# Patient Record
Sex: FEMALE | Race: WHITE | NOT HISPANIC OR LATINO | Employment: FULL TIME | URBAN - METROPOLITAN AREA
[De-identification: names, ages, dates, MRNs, and addresses within clinical notes are randomized per-mention and may not be internally consistent; named-entity substitution may affect disease eponyms.]

---

## 2017-03-13 ENCOUNTER — OFFICE VISIT - HEALTHEAST (OUTPATIENT)
Dept: INTERNAL MEDICINE | Facility: CLINIC | Age: 22
End: 2017-03-13

## 2017-03-13 DIAGNOSIS — K21.9 GERD (GASTROESOPHAGEAL REFLUX DISEASE): ICD-10-CM

## 2017-03-13 ASSESSMENT — MIFFLIN-ST. JEOR: SCORE: 1523.43

## 2017-03-27 ENCOUNTER — RECORDS - HEALTHEAST (OUTPATIENT)
Dept: ADMINISTRATIVE | Facility: OTHER | Age: 22
End: 2017-03-27

## 2017-03-28 ENCOUNTER — RECORDS - HEALTHEAST (OUTPATIENT)
Dept: ADMINISTRATIVE | Facility: OTHER | Age: 22
End: 2017-03-28

## 2017-04-19 ENCOUNTER — OFFICE VISIT - HEALTHEAST (OUTPATIENT)
Dept: INTERNAL MEDICINE | Facility: CLINIC | Age: 22
End: 2017-04-19

## 2017-04-19 DIAGNOSIS — K29.70 GASTRITIS: ICD-10-CM

## 2017-04-19 ASSESSMENT — MIFFLIN-ST. JEOR: SCORE: 1523.43

## 2017-04-20 ENCOUNTER — COMMUNICATION - HEALTHEAST (OUTPATIENT)
Dept: INTERNAL MEDICINE | Facility: CLINIC | Age: 22
End: 2017-04-20

## 2021-05-30 VITALS — HEIGHT: 66 IN | BODY MASS INDEX: 27.48 KG/M2 | WEIGHT: 171 LBS

## 2021-05-30 VITALS — BODY MASS INDEX: 27.48 KG/M2 | HEIGHT: 66 IN | WEIGHT: 171 LBS

## 2021-06-09 NOTE — PROGRESS NOTES
Office Visit - Follow up    Radha Velázquez   21 y.o. female    Date of Visit: 3/13/2017    Chief Complaint   Patient presents with     Gastroesophageal Reflux       Subjective: New patient office visit for acid reflux.    Lump in throat will go away excessive gas.  Dry cough getting better.  There is a family history of Snider's esophagus.  No substernal burning sensation no weight loss no dysphagia or odynophagia.    No melena no blood in stool or urine uses ibuprofen once a week or headache.  Medication list reviewed.  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    ROS: A comprehensive review of systems was performed and was otherwise negative    Medications:  Prior to Admission medications    Not on File       Allergies: No Known Allergies    Immunizations:   There is no immunization history on file for this patient.    Exam Chest clear to auscultation and percussion.  Heart tones regular rhythm without murmur rub or gallop.  Abdomen soft nontender no organomegaly.  No peritoneal signs.  Extremities free of edema cyanosis or clubbing.  Neck veins nondistended no thyromegaly or scleral icterus noted, carotids full.  Skin warm and dry easily conversant good spirited.  Normal intelligence.  Neurologically intact no gross localizing findings.  No goiter.    Assessment and Plan  Acid reflux disease goiter.  And dysphagia symptoms subsiding.  Needs [facility] consult for upper GI endoscopy.    Headaches uses ibuprofen once a week.    Overweight discussed see below BMI 28.    Lump in throat.  Needs follow-up exam 1 month's time.    Time: total time spent with the patient was 40 minutes of which >50% was spent in counseling and coordination of care    The following high BMI interventions were performed this visit: encouragement to exercise    Ramón Ramirez MD    There is no problem list on file for this patient.

## 2021-06-10 NOTE — PROGRESS NOTES
Office Visit - Follow up    Radha Velázquez   21 y.o. female    Date of Visit: 4/19/2017    Chief Complaint   Patient presents with     Gastroesophageal Reflux       Subjective: Gastritis.  With acid reflux dyspepsia.  Feels like a lump in her throat recent EGD D endoscopy study showed gastritis normal esophagus normal duodenum no ulcer or neoplasia.  H. pylori biopsies negative.    Prior history of thyroid issue.    Denies blood in stool or urine med list reviewed.    ROS: A comprehensive review of systems was performed and was otherwise negative    Medications:  Prior to Admission medications    Not on File       Allergies: No Known Allergies    Immunizations:   There is no immunization history on file for this patient.    Exam Chest clear to auscultation and percussion.  Heart tones regular rhythm without murmur rub or gallop.  Abdomen soft nontender no organomegaly.  No peritoneal signs.  Extremities free of edema cyanosis or clubbing.  Neck veins nondistended no thyromegaly or scleral icterus noted, carotids full.  Skin warm and dry easily conversant good spirited.  Normal intelligence.  Neurologically intact no gross localizing findings.    Assessment and Plan  Gastritis less alcohol less coffee and chocolate caffeine.  Suggest Pepcid 20 mg twice daily over-the-counter Pepcid AC specifically.  Check TSH level today.    Hypothyroidism questionable.    Overweight discussed see below.    Time: total time spent with the patient was 25 minutes of which >50% was spent in counseling and coordination of care    The following high BMI interventions were performed this visit: encouragement to exercise return to clinic 2 months.    Ramón Ramirez MD    Patient Active Problem List   Diagnosis     Gastritis

## 2021-06-15 PROBLEM — K29.70 GASTRITIS: Status: ACTIVE | Noted: 2017-04-19

## 2024-06-05 ENCOUNTER — TELEPHONE (OUTPATIENT)
Dept: CALL CENTER | Age: 29
End: 2024-06-05
Payer: COMMERCIAL

## 2024-06-05 NOTE — TELEPHONE ENCOUNTER
Called and spoke to Radha - she hung up     CALLED AT 1137 AM     If she calls back - ok to schedule with our general ophthalmology team - dr. Hidalgo / Dr. Barahona / Dr. Parra in a new adult eye     PLS DO NOT SCHEDULE WITH CHELSEY -     Abeba Mc Communication Facilitator on 6/5/2024 at 11:41 AM

## 2024-06-05 NOTE — TELEPHONE ENCOUNTER
Health Call Center    Phone Message    May a detailed message be left on voicemail: yes     Reason for Call: Other: Patient was seen at [facility] ER yesterday and was referred to see an ophthalmologist at [facility] for ER follow up. Patient works at [employer] [facility] [employer] and they told her she should see Dr Aceves neuro ophthalmologist instead of general because one of her eyes is more dilated then the other. Patient will fax medical records in today to [phone].     Once received please review and call patient back at [phone] to schedule with appropriate provider. Thank you.    Action Taken: Message routed to:  [facility] ([facility]): Eye    Travel Screening: Not Applicable     Date of Service:

## 2024-08-07 NOTE — PROGRESS NOTES
1. Benign episodic mydriasis    Radha complains of asymmetric pupils, larger on the right side. She noticed this around 2.5 months ago and went to the ED for a check up, had a CT head, which was normal. Today we reviewed 2 photos that revealed she had larger asymmetric pupils, one on the right and the other on the left.     Her exam today was reassuring without significant strabismus or ptosis.     Plan:  Follow up as needed      Radha Mark is a pleasant 28 year old White female who presents to my neuro-ophthalmology clinic today having been referred by Dr. Lieberman for asymmetric pupils.      HPI:   She is a 28 years old female, who started to notice larger pupil size on the right side since 2.5 months ago. No headaches, no diplopia, she feels that her eyes are progressively getting worse regarding her distance vision. She feels that the right pupil has been the larger one. We reviewed a couple of photos that she had asymmetric large pupils and this has alternated.      She started to use Lumify about a 1 year ago up to 2.5 months ago.      Since she stopped her lumify she has still had significant asymmetric pupils    Uses Maskara every day.    Patient gets occasional headaches but no migraines.      Independent historians:  Patient    Review of outside testin/4/24 CT head wo contrast   FINDINGS:   INTRACRANIAL CONTENTS: No intracranial hemorrhage, extraaxial collection, or mass effect.  No CT evidence of acute infarct. Normal parenchymal attenuation. Normal ventricles and sulci.     VISUALIZED ORBITS/SINUSES/MASTOIDS: No intraorbital abnormality. No paranasal sinus mucosal disease. No middle ear or mastoid effusion.     BONES/SOFT TISSUES: No acute abnormality.     My interpretation performed today of outside testing:  I have independently reviewed CT performed on 24. I did not see ant orbital abnormalities.    Review of outside clinical notes:    24 Visit with Dr. Satish Eisenberg  [place]      Past medical history:    Patient Active Problem List   Diagnosis    Gastritis     Patient currently has no medications in their medication list.. Lumify, stopped using it 2.5 months ago. List is confirmed today.    Family history / social history:  Patient's family history is not on file. Grandmother had a brain aneurism, she developed symptoms in her 70s.     Patient  reports that she has quit smoking. Her smoking use included cigarettes. She does not have any smokeless tobacco history on file. She reports current alcohol use of about 3.0 standard drinks of alcohol per week. She reports that she does not use drugs.     Exam:  Please see epic chart for complete exam.  The patient has normal afferent visual function in both eyes including normal best corrected visual acuity, color vision, confrontation visual fields, and pupillary light responses in both eyes.     Structural eye exam unremarkable today.    See pupillary exam in the the pupil portion of the exam.           Precharting:  Nando Stephens MD   Fellow, Neuro-Ophthalmology    35 minutes were spent on the date of the encounter by me doing chart review, history and exam, documentation, and further activities as noted above    Complete documentation of historical and exam elements from today's encounter can be found in the full encounter summary report (not reduplicated in this progress note).  I personally obtained the chief complaint(s) and history of present illness.  I confirmed and edited as necessary the review of systems, past medical/surgical history, family history, social history, and examination findings as documented by others; and I examined the patient myself.  I personally reviewed the relevant tests, images, and reports as documented above.  I formulated and edited as necessary the assessment and plan and discussed the findings and management plan with the patient and family     Heladio Aceves MD

## 2024-08-08 ENCOUNTER — OFFICE VISIT (OUTPATIENT)
Dept: OPHTHALMOLOGY | Facility: CLINIC | Age: 29
End: 2024-08-08
Attending: STUDENT IN AN ORGANIZED HEALTH CARE EDUCATION/TRAINING PROGRAM
Payer: COMMERCIAL

## 2024-08-08 DIAGNOSIS — H53.10 SUBJECTIVE VISUAL DISTURBANCE: Primary | ICD-10-CM

## 2024-08-08 DIAGNOSIS — H57.02 PUPIL ASYMMETRY: ICD-10-CM

## 2024-08-08 PROCEDURE — 99214 OFFICE O/P EST MOD 30 MIN: CPT | Performed by: OPHTHALMOLOGY

## 2024-08-08 PROCEDURE — 99203 OFFICE O/P NEW LOW 30 MIN: CPT | Mod: GC | Performed by: OPHTHALMOLOGY

## 2024-08-08 ASSESSMENT — CUP TO DISC RATIO
OS_RATIO: 0.2
OD_RATIO: 0.25

## 2024-08-08 ASSESSMENT — CONF VISUAL FIELD
OS_INFERIOR_NASAL_RESTRICTION: 0
OS_INFERIOR_TEMPORAL_RESTRICTION: 0
OS_SUPERIOR_NASAL_RESTRICTION: 0
OD_SUPERIOR_NASAL_RESTRICTION: 0
OD_NORMAL: 1
OS_NORMAL: 1
OD_SUPERIOR_TEMPORAL_RESTRICTION: 0
OS_SUPERIOR_TEMPORAL_RESTRICTION: 0
OD_INFERIOR_NASAL_RESTRICTION: 0
OD_INFERIOR_TEMPORAL_RESTRICTION: 0
METHOD: COUNTING FINGERS

## 2024-08-08 ASSESSMENT — MARGIN REFLEX DISTANCE
OD_MRD2: 5
OS_MRD2: 5
OS_MRD1: 4
OD_MRD1: 4

## 2024-08-08 ASSESSMENT — VISUAL ACUITY
OS_SC+: -1
OD_SC+: -2
OD_PH_SC: 20/20
OS_SC: 20/50
OD_SC: 20/30
METHOD: SNELLEN - LINEAR
OS_PH_SC: 20/25

## 2024-08-08 ASSESSMENT — TONOMETRY
OD_IOP_MMHG: 21
IOP_METHOD: ICARE
OS_IOP_MMHG: 21

## 2024-08-08 ASSESSMENT — EXTERNAL EXAM - LEFT EYE: OS_EXAM: NORMAL

## 2024-08-08 ASSESSMENT — SLIT LAMP EXAM - LIDS
COMMENTS: NORMAL
COMMENTS: NORMAL

## 2024-08-08 ASSESSMENT — EXTERNAL EXAM - RIGHT EYE: OD_EXAM: NORMAL

## 2024-08-08 NOTE — NURSING NOTE
Chief Complaint(s) and History of Present Illness(es)       New Patient    In both eyes.  Onset was sudden.  This started 4 months ago.  Since onset it is stable.  Associated symptoms include Negative for double vision, eye pain and headache.  Pain was noted as 0/10.             Comments    Radha Mark is a 28 year old female sent for consultation for anisocoria.  Radha reports anisocoria noted in the last 4 months. Typically, the right pupil is larger but it can alternate.  Anisocoria thought to be related to eyedrops (Lumify) but she was instructed to stop taking them per Dr. Barahona and anisocoria still present.  She denies ptosis but says she was talking with Dr. Aceves and he noticed one upper lid appeared droopier. No eye pain or headaches.    NÉSTOR Snow 8/8/2024 7:20 AM

## 2025-05-02 PROBLEM — R10.2 FEELING PELVIC PRESSURE IN PREGNANCY, ANTEPARTUM: Status: RESOLVED | Noted: 2023-08-20 | Resolved: 2025-05-02

## 2025-05-02 PROBLEM — J45.990 EXERCISE INDUCED BRONCHOSPASM: Status: ACTIVE | Noted: 2025-05-02

## 2025-05-02 PROBLEM — H57.04: Status: ACTIVE | Noted: 2025-05-02

## 2025-05-02 PROBLEM — L72.9 SCALP CYST: Status: ACTIVE | Noted: 2025-05-02

## 2025-05-02 PROBLEM — O26.899 FEELING PELVIC PRESSURE IN PREGNANCY, ANTEPARTUM: Status: RESOLVED | Noted: 2023-08-20 | Resolved: 2025-05-02

## 2025-05-02 PROBLEM — J45.990 EXERCISE INDUCED BRONCHOSPASM: Status: RESOLVED | Noted: 2025-05-02 | Resolved: 2025-05-02

## 2025-05-12 ENCOUNTER — TELEPHONE (OUTPATIENT)
Dept: DERMATOLOGY | Facility: CLINIC | Age: 30
End: 2025-05-12
Payer: COMMERCIAL

## 2025-05-12 NOTE — TELEPHONE ENCOUNTER
Online Appointment Request      Health Call Center  Phone Message      Reason for Call: Appointment Intake     Patients Requests:  Appointment Preferences  Reason for appointment  Skin check  Preferred Visit Type  In-person        Action taken: Other: none; Documenting patient was called; detail message was left for patient to call back.

## 2025-05-14 NOTE — PROGRESS NOTES
Procedure: pilar cyst excision of scalp     Indication: Painful and bothersome to patient     Consent: Indications for procedure, risks, benefits and alternatives reviewed with the patient. Informed consent form signed.    Pertinent history for this lesion:   Growing and bothersome scalp cyst, recurrent     Gross Description of lesion   Location (include picture as appropriate): posterior right scalp        Post-op:      Procedure Description:  A time-out was completed verifying correct patient, procedure, site, positioning, and special equipment prior to beginning this procedure.    Site prepped with: alcohol wipes and betadine     Anesthesia: 5 mL of lidocaine 1% with epinephrine     Location of anesthesia injected: local    Incision made with 15-blade scalpel     Complete removal of cyst with capsule intact.     Final size of surgical site: 2 cm    Hemostasis with direct pressure and 4-0 ethilon sutures x 3    Bacitracin ointment applied. Dressing applied.    Specimen sent to pathology.    EBL: minimal    Complications: none    The patient tolerated the procedure well.     Wound care instructions provided to patient.     1. Scalp cyst (Primary)  - Surgical Pathology Exam  - EXC BENIGN SKIN LESION SCLP/NCK/HNDS/FEET/GEN 2.1-3.0 CM     Follow-up: in 1 week for suture removal    Your partner in health,    Kennedy Mcneal MD     Note to patient: The 21st Century Cures Act makes medical notes like this available to patients in the interest of transparency. However, be advised this is a medical document. It is intended as bgrs-dt-kicq communication. It is written in medical language and may contain abbreviations or verbiage that are unfamiliar. It may appear blunt or direct. Medical documents are intended to carry relevant information, facts as evident, and the clinical opinion of the practitioner.

## 2025-05-15 ENCOUNTER — OFFICE VISIT (OUTPATIENT)
Dept: FAMILY MEDICINE | Facility: CLINIC | Age: 30
End: 2025-05-15
Payer: COMMERCIAL

## 2025-05-15 VITALS
HEIGHT: 66 IN | BODY MASS INDEX: 28.06 KG/M2 | DIASTOLIC BLOOD PRESSURE: 74 MMHG | OXYGEN SATURATION: 96 % | WEIGHT: 174.6 LBS | TEMPERATURE: 98 F | RESPIRATION RATE: 16 BRPM | HEART RATE: 63 BPM | SYSTOLIC BLOOD PRESSURE: 108 MMHG

## 2025-05-15 DIAGNOSIS — L72.9 SCALP CYST: Primary | ICD-10-CM

## 2025-05-15 ASSESSMENT — PAIN SCALES - GENERAL: PAINLEVEL_OUTOF10: NO PAIN (0)

## 2025-05-15 ASSESSMENT — PATIENT HEALTH QUESTIONNAIRE - PHQ9: SUM OF ALL RESPONSES TO PHQ QUESTIONS 1-9: 0

## 2025-05-15 NOTE — PROGRESS NOTES
"  {PROVIDER CHARTING PREFERENCE:878520}    Luis Garcia is a 29 year old, presenting for the following health issues:  Cyst Removal        5/15/2025     9:15 AM   Additional Questions   Roomed by Daily CORTÉS   Accompanied by Self         5/15/2025     9:15 AM   Patient Reported Additional Medications   Patient reports taking the following new medications None     History of Present Illness       Reason for visit:  Lump on my head    She eats 2-3 servings of fruits and vegetables daily.She consumes 1 sweetened beverage(s) daily.She exercises with enough effort to increase her heart rate 30 to 60 minutes per day.  She exercises with enough effort to increase her heart rate 4 days per week.   She is taking medications regularly.    {SUPERLIST (Optional):610064}  {additonal problems for provider to add (Optional):559114}    {ROS Picklists (Optional):103191}      Objective    /74 (BP Location: Left arm, Patient Position: Sitting, Cuff Size: Adult Large)   Pulse 63   Temp 98  F (36.7  C) (Oral)   Resp 16   Ht 1.67 m (5' 5.75\")   Wt 79.2 kg (174 lb 9.6 oz)   LMP 04/28/2025   SpO2 96%   BMI 28.40 kg/m    Body mass index is 28.4 kg/m .  Physical Exam   {Exam List (Optional):534582}    {Diagnostic Test Results (Optional):937000}        Signed Electronically by: Kennedy Mcneal MD  {Email feedback regarding this note to [email or web address]   :183024}  "

## 2025-05-21 LAB
PATH REPORT.COMMENTS IMP SPEC: NORMAL
PATH REPORT.COMMENTS IMP SPEC: NORMAL
PATH REPORT.FINAL DX SPEC: NORMAL
PATH REPORT.GROSS SPEC: NORMAL
PATH REPORT.MICROSCOPIC SPEC OTHER STN: NORMAL
PATH REPORT.RELEVANT HX SPEC: NORMAL
PHOTO IMAGE: NORMAL

## 2025-05-22 ENCOUNTER — RESULTS FOLLOW-UP (OUTPATIENT)
Dept: FAMILY MEDICINE | Facility: CLINIC | Age: 30
End: 2025-05-22

## 2025-05-22 ENCOUNTER — OFFICE VISIT (OUTPATIENT)
Dept: FAMILY MEDICINE | Facility: CLINIC | Age: 30
End: 2025-05-22
Payer: COMMERCIAL

## 2025-05-22 VITALS
TEMPERATURE: 98.3 F | BODY MASS INDEX: 28.16 KG/M2 | SYSTOLIC BLOOD PRESSURE: 96 MMHG | HEART RATE: 59 BPM | RESPIRATION RATE: 16 BRPM | HEIGHT: 66 IN | DIASTOLIC BLOOD PRESSURE: 74 MMHG | OXYGEN SATURATION: 96 % | WEIGHT: 175.2 LBS

## 2025-05-22 DIAGNOSIS — L72.9 SCALP CYST: Primary | ICD-10-CM

## 2025-05-22 NOTE — PROGRESS NOTES
{PROVIDER CHARTING PREFERENCE:191497}    Luis Garcia is a 29 year old, presenting for the following health issues:  Suture Removal (Head/Put in on 5/15/25)      5/22/2025     9:58 AM   Additional Questions   Roomed by Kira Lopez CMA   Accompanied by None         5/22/2025     9:58 AM   Patient Reported Additional Medications   Patient reports taking the following new medications none     Suture Removal    History of Present Illness       Reason for visit:  Suture cyst removal    She eats 2-3 servings of fruits and vegetables daily.She consumes 1 sweetened beverage(s) daily.She exercises with enough effort to increase her heart rate 30 to 60 minutes per day.  She exercises with enough effort to increase her heart rate 4 days per week.   She is taking medications regularly.        {MA/LPN/RN Pre-Provider Visit Orders- hCG/UA/Strep (Optional):054939}  {SUPERLIST (Optional):941071}  {additonal problems for provider to add (Optional):951278}    {ROS Picklists (Optional):259073}      Objective    LMP 04/28/2025   There is no height or weight on file to calculate BMI.  Physical Exam   {Exam List (Optional):084404}    {Diagnostic Test Results (Optional):005038}        Signed Electronically by: Kennedy Mcneal MD  {Email feedback regarding this note to [email or web address]   :[id number]}

## 2025-05-22 NOTE — PROGRESS NOTES
Procedure: pilar cyst excision of scalp      Indication: painful, bothersome to patient      Consent: Indications for procedure, risks, benefits and alternatives reviewed with the patient. Informed consent form signed.     Pertinent history for this lesion:   Growing and bothersome scalp cyst, recurrent      Gross Description of lesion   Location (include picture as appropriate): anterior mid scalp on hairline         Procedure Description:  A time-out was completed verifying correct patient, procedure, site, positioning, and special equipment prior to beginning this procedure.     Site prepped with: alcohol wipes and betadine      Anesthesia: 2 mL of lidocaine 1% with epinephrine      Location of anesthesia injected: local     Incision made with 15-blade scalpel      Complete removal of cyst with capsule intact.      Final size of surgical site: 1.5 cm     Hemostasis with direct pressure and 4-0 ethilon sutures x 3     Bacitracin ointment applied. Dressing applied.     Specimen was not sent to pathology.     EBL: minimal     Complications: none     The patient tolerated the procedure well.     Wound care instructions provided to patient.      1. Scalp cyst (Primary)  - REMOVAL OF SUTURES  - EXC BENIGN SKIN LESION SCLP/NCK/HNDS/FEET/GEN 1.1-2.0 CM     Follow-up: in 1 week for suture removal     Your partner in health,     Kennedy Mcneal MD      Note to patient: The 21st Century Cures Act makes medical notes like this available to patients in the interest of transparency. However, be advised this is a medical document. It is intended as bcbp-nw-iltt communication. It is written in medical language and may contain abbreviations or verbiage that are unfamiliar. It may appear blunt or direct. Medical documents are intended to carry relevant information, facts as evident, and the clinical opinion of the practitioner.

## 2025-07-08 SDOH — HEALTH STABILITY: PHYSICAL HEALTH: ON AVERAGE, HOW MANY DAYS PER WEEK DO YOU ENGAGE IN MODERATE TO STRENUOUS EXERCISE (LIKE A BRISK WALK)?: 4 DAYS

## 2025-07-08 SDOH — HEALTH STABILITY: PHYSICAL HEALTH: ON AVERAGE, HOW MANY MINUTES DO YOU ENGAGE IN EXERCISE AT THIS LEVEL?: 40 MIN

## 2025-07-08 ASSESSMENT — SOCIAL DETERMINANTS OF HEALTH (SDOH): HOW OFTEN DO YOU GET TOGETHER WITH FRIENDS OR RELATIVES?: ONCE A WEEK

## 2025-07-09 ENCOUNTER — OFFICE VISIT (OUTPATIENT)
Dept: FAMILY MEDICINE | Facility: CLINIC | Age: 30
End: 2025-07-09
Payer: COMMERCIAL

## 2025-07-09 VITALS
TEMPERATURE: 97 F | RESPIRATION RATE: 16 BRPM | HEIGHT: 66 IN | HEART RATE: 70 BPM | OXYGEN SATURATION: 98 % | DIASTOLIC BLOOD PRESSURE: 87 MMHG | BODY MASS INDEX: 28.45 KG/M2 | SYSTOLIC BLOOD PRESSURE: 123 MMHG | WEIGHT: 177 LBS

## 2025-07-09 DIAGNOSIS — N92.0 MENORRHAGIA WITH REGULAR CYCLE: ICD-10-CM

## 2025-07-09 DIAGNOSIS — L70.9 ACNE, UNSPECIFIED ACNE TYPE: ICD-10-CM

## 2025-07-09 DIAGNOSIS — Z13.1 SCREENING FOR DIABETES MELLITUS: ICD-10-CM

## 2025-07-09 DIAGNOSIS — Z00.00 ROUTINE GENERAL MEDICAL EXAMINATION AT A HEALTH CARE FACILITY: Primary | ICD-10-CM

## 2025-07-09 DIAGNOSIS — Z01.84 IMMUNITY STATUS TESTING: ICD-10-CM

## 2025-07-09 DIAGNOSIS — Z11.1 SCREENING EXAMINATION FOR PULMONARY TUBERCULOSIS: ICD-10-CM

## 2025-07-09 DIAGNOSIS — Z13.220 LIPID SCREENING: ICD-10-CM

## 2025-07-09 LAB
ALBUMIN SERPL BCG-MCNC: 4.8 G/DL (ref 3.5–5.2)
ALP SERPL-CCNC: 69 U/L (ref 40–150)
ALT SERPL W P-5'-P-CCNC: 19 U/L (ref 0–50)
ANION GAP SERPL CALCULATED.3IONS-SCNC: 13 MMOL/L (ref 7–15)
AST SERPL W P-5'-P-CCNC: 22 U/L (ref 0–45)
BASOPHILS # BLD AUTO: 0 10E3/UL (ref 0–0.2)
BASOPHILS NFR BLD AUTO: 0 %
BILIRUB SERPL-MCNC: 0.8 MG/DL
BUN SERPL-MCNC: 11.7 MG/DL (ref 6–20)
CALCIUM SERPL-MCNC: 9.8 MG/DL (ref 8.8–10.4)
CHLORIDE SERPL-SCNC: 103 MMOL/L (ref 98–107)
CHOLEST SERPL-MCNC: 204 MG/DL
CREAT SERPL-MCNC: 0.59 MG/DL (ref 0.51–0.95)
EGFRCR SERPLBLD CKD-EPI 2021: >90 ML/MIN/1.73M2
EOSINOPHIL # BLD AUTO: 0.1 10E3/UL (ref 0–0.7)
EOSINOPHIL NFR BLD AUTO: 2 %
ERYTHROCYTE [DISTWIDTH] IN BLOOD BY AUTOMATED COUNT: 11.5 % (ref 10–15)
FASTING STATUS PATIENT QL REPORTED: YES
FASTING STATUS PATIENT QL REPORTED: YES
GLUCOSE SERPL-MCNC: 94 MG/DL (ref 70–99)
HBV SURFACE AB SERPL IA-ACNC: <3.5 M[IU]/ML
HBV SURFACE AB SERPL IA-ACNC: NONREACTIVE M[IU]/ML
HCO3 SERPL-SCNC: 22 MMOL/L (ref 22–29)
HCT VFR BLD AUTO: 39.7 % (ref 35–47)
HDLC SERPL-MCNC: 50 MG/DL
HGB BLD-MCNC: 13.2 G/DL (ref 11.7–15.7)
IMM GRANULOCYTES # BLD: 0 10E3/UL
IMM GRANULOCYTES NFR BLD: 0 %
LDLC SERPL CALC-MCNC: 121 MG/DL
LYMPHOCYTES # BLD AUTO: 1.6 10E3/UL (ref 0.8–5.3)
LYMPHOCYTES NFR BLD AUTO: 31 %
MCH RBC QN AUTO: 29.1 PG (ref 26.5–33)
MCHC RBC AUTO-ENTMCNC: 33.2 G/DL (ref 31.5–36.5)
MCV RBC AUTO: 87 FL (ref 78–100)
MEV IGG SER IA-ACNC: 115 AU/ML
MEV IGG SER IA-ACNC: POSITIVE
MONOCYTES # BLD AUTO: 0.3 10E3/UL (ref 0–1.3)
MONOCYTES NFR BLD AUTO: 5 %
MUMPS ANTIBODY IGG INSTRUMENT VALUE: <5 AU/ML
MUV IGG SER QL IA: NORMAL
NEUTROPHILS # BLD AUTO: 3.2 10E3/UL (ref 1.6–8.3)
NEUTROPHILS NFR BLD AUTO: 62 %
NONHDLC SERPL-MCNC: 154 MG/DL
PLATELET # BLD AUTO: 208 10E3/UL (ref 150–450)
POTASSIUM SERPL-SCNC: 4.3 MMOL/L (ref 3.4–5.3)
PROT SERPL-MCNC: 7.3 G/DL (ref 6.4–8.3)
RBC # BLD AUTO: 4.54 10E6/UL (ref 3.8–5.2)
RUBV IGG SERPL QL IA: 8.46 INDEX
RUBV IGG SERPL QL IA: POSITIVE
SODIUM SERPL-SCNC: 138 MMOL/L (ref 135–145)
TRIGL SERPL-MCNC: 164 MG/DL
VZV IGG SER QL IA: 4.82 S/CO
VZV IGG SER QL IA: POSITIVE
WBC # BLD AUTO: 5.2 10E3/UL (ref 4–11)

## 2025-07-09 PROCEDURE — 3079F DIAST BP 80-89 MM HG: CPT | Performed by: PHYSICIAN ASSISTANT

## 2025-07-09 PROCEDURE — 99213 OFFICE O/P EST LOW 20 MIN: CPT | Mod: 25 | Performed by: PHYSICIAN ASSISTANT

## 2025-07-09 PROCEDURE — 86735 MUMPS ANTIBODY: CPT | Performed by: PHYSICIAN ASSISTANT

## 2025-07-09 PROCEDURE — 86765 RUBEOLA ANTIBODY: CPT | Performed by: PHYSICIAN ASSISTANT

## 2025-07-09 PROCEDURE — 99395 PREV VISIT EST AGE 18-39: CPT | Performed by: PHYSICIAN ASSISTANT

## 2025-07-09 PROCEDURE — 80061 LIPID PANEL: CPT | Performed by: PHYSICIAN ASSISTANT

## 2025-07-09 PROCEDURE — 86762 RUBELLA ANTIBODY: CPT | Performed by: PHYSICIAN ASSISTANT

## 2025-07-09 PROCEDURE — 86706 HEP B SURFACE ANTIBODY: CPT | Performed by: PHYSICIAN ASSISTANT

## 2025-07-09 PROCEDURE — 3074F SYST BP LT 130 MM HG: CPT | Performed by: PHYSICIAN ASSISTANT

## 2025-07-09 PROCEDURE — 1126F AMNT PAIN NOTED NONE PRSNT: CPT | Performed by: PHYSICIAN ASSISTANT

## 2025-07-09 PROCEDURE — 85025 COMPLETE CBC W/AUTO DIFF WBC: CPT | Performed by: PHYSICIAN ASSISTANT

## 2025-07-09 PROCEDURE — 86481 TB AG RESPONSE T-CELL SUSP: CPT | Performed by: PHYSICIAN ASSISTANT

## 2025-07-09 PROCEDURE — 86787 VARICELLA-ZOSTER ANTIBODY: CPT | Performed by: PHYSICIAN ASSISTANT

## 2025-07-09 PROCEDURE — 36415 COLL VENOUS BLD VENIPUNCTURE: CPT | Performed by: PHYSICIAN ASSISTANT

## 2025-07-09 PROCEDURE — 80053 COMPREHEN METABOLIC PANEL: CPT | Performed by: PHYSICIAN ASSISTANT

## 2025-07-09 RX ORDER — TRETINOIN 0.25 MG/G
CREAM TOPICAL AT BEDTIME
Qty: 45 G | Refills: 1 | Status: SHIPPED | OUTPATIENT
Start: 2025-07-09

## 2025-07-09 ASSESSMENT — PAIN SCALES - GENERAL: PAINLEVEL_OUTOF10: NO PAIN (0)

## 2025-07-09 NOTE — PATIENT INSTRUCTIONS
Patient Education   Preventive Care Advice   This is general advice given by our system to help you stay healthy. However, your care team may have specific advice just for you. Please talk to your care team about your preventive care needs.  Nutrition  Eat 5 or more servings of fruits and vegetables each day.  Try wheat bread, brown rice and whole grain pasta (instead of white bread, rice, and pasta).  Get enough calcium and vitamin D. Check the label on foods and aim for 100% of the RDA (recommended daily allowance).  Lifestyle  Exercise at least 150 minutes each week  (30 minutes a day, 5 days a week).  Do muscle strengthening activities 2 days a week. These help control your weight and prevent disease.  No smoking.  Wear sunscreen to prevent skin cancer.  Have a dental exam and cleaning every 6 months.  Yearly exams  See your health care team every year to talk about:  Any changes in your health.  Any medicines your care team has prescribed.  Preventive care, family planning, and ways to prevent chronic diseases.  Shots (vaccines)   HPV shots (up to age 26), if you've never had them before.  Hepatitis B shots (up to age 59), if you've never had them before.  COVID-19 shot: Get this shot when it's due.  Flu shot: Get a flu shot every year.  Tetanus shot: Get a tetanus shot every 10 years.  Pneumococcal, hepatitis A, and RSV shots: Ask your care team if you need these based on your risk.  Shingles shot (for age 50 and up)  General health tests  Diabetes screening:  Starting at age 35, Get screened for diabetes at least every 3 years.  If you are younger than age 35, ask your care team if you should be screened for diabetes.  Cholesterol test: At age 39, start having a cholesterol test every 5 years, or more often if advised.  Bone density scan (DEXA): At age 50, ask your care team if you should have this scan for osteoporosis (brittle bones).  Hepatitis C: Get tested at least once in your life.  STIs (sexually  transmitted infections)  Before age 24: Ask your care team if you should be screened for STIs.  After age 24: Get screened for STIs if you're at risk. You are at risk for STIs (including HIV) if:  You are sexually active with more than one person.  You don't use condoms every time.  You or a partner was diagnosed with a sexually transmitted infection.  If you are at risk for HIV, ask about PrEP medicine to prevent HIV.  Get tested for HIV at least once in your life, whether you are at risk for HIV or not.  Cancer screening tests  Cervical cancer screening: If you have a cervix, begin getting regular cervical cancer screening tests starting at age 21.  Breast cancer scan (mammogram): If you've ever had breasts, begin having regular mammograms starting at age 40. This is a scan to check for breast cancer.  Colon cancer screening: It is important to start screening for colon cancer at age 45.  Have a colonoscopy test every 10 years (or more often if you're at risk) Or, ask your provider about stool tests like a FIT test every year or Cologuard test every 3 years.  To learn more about your testing options, visit:   .  For help making a decision, visit:   https://bit.ly/ar43510.  Prostate cancer screening test: If you have a prostate, ask your care team if a prostate cancer screening test (PSA) at age 55 is right for you.  Lung cancer screening: If you are a current or former smoker ages 50 to 80, ask your care team if ongoing lung cancer screenings are right for you.  For informational purposes only. Not to replace the advice of your health care provider. Copyright   2023 [employer]. All rights reserved. Clinically reviewed by the [facility]. [employer] [id number] - REV 01/24.

## 2025-07-09 NOTE — PROGRESS NOTES
"Preventive Care Visit  [facility]  Sam Price PA-C, Family Medicine  Jul 9, 2025      Assessment & Plan       ICD-10-CM    1. Routine general medical examination at a health care facility  Z00.00 CBC with platelets and differential     CBC with platelets and differential      2. Acne, unspecified acne type  L70.9 OFFICE/OUTPT VISIT,EST,LEVL III      3. Menorrhagia with regular cycle  N92.0 tretinoin (RETIN-A) 0.025 % external cream     Ob/Gyn  Referral     OFFICE/OUTPT VISIT,EST,LEVL III      4. Lipid screening  Z13.220 Lipid panel reflex to direct LDL Fasting     Lipid panel reflex to direct LDL Fasting      5. Screening for diabetes mellitus  Z13.1 Comprehensive metabolic panel (BMP + Alb, Alk Phos, ALT, AST, Total. Bili, TP)     Comprehensive metabolic panel (BMP + Alb, Alk Phos, ALT, AST, Total. Bili, TP)      6. Screening examination for pulmonary tuberculosis  Z11.1 Quantiferon TB Gold Plus     Quantiferon TB Gold Plus      7. Immunity status testing  Z01.84 Hepatitis B Surface Antibody     Mumps Immune Status, IgG     Rubella Antibody IgG     Rubeola Antibody IgG     Varicella Zoster Virus Antibody IgG     Hepatitis B Surface Antibody     Mumps Immune Status, IgG     Rubella Antibody IgG     Rubeola Antibody IgG     Varicella Zoster Virus Antibody IgG      Work on Healthy diet and exercise. Getting heart rate elevated for 30 mins most days of week.  Labs pending  2. medical conditions are stable. meds refilled.  3. Follow up  with Ob/gyn. warning signs discussed.   Forms filled out for RN school.     BMI  Estimated body mass index is 29.01 kg/m  as calculated from the following:    Height as of this encounter: 1.664 m (5' 5.5\").    Weight as of this encounter: 80.3 kg (177 lb).   Weight management plan: Discussed healthy diet and exercise guidelines    Counseling  Appropriate preventive services were addressed with this patient via screening, questionnaire, or " discussion as appropriate for fall prevention, nutrition, physical activity, Tobacco-use cessation, social engagement, weight loss and cognition.  Checklist reviewing preventive services available has been given to the patient.  Reviewed patient's diet, addressing concerns and/or questions.   The patient was instructed to see the dentist every 6 months.   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    Also has a history of acne and needs a refill of her Retin-A.    Also has a history of heavy periods that are regular she is not willing to go on any type of birth control.  She has any chest pain or shortness of breath or headaches or dizziness.      Advance Care Planning    Discussed advance care planning with patient; informed AVS has link to [employer].        7/8/2025   General Health   How would you rate your overall physical health? Good         7/8/2025   Nutrition   Three or more servings of calcium each day? Yes   Diet: I don't know   How many servings of fruit and vegetables per day? (!) 2-3   How many sweetened beverages each day? 0-1         7/8/2025   Exercise   Days per week of moderate/strenous exercise 4 days   Average minutes spent exercising at this level 40 min         7/8/2025   Social Factors   Frequency of gathering with friends or relatives Once a week   Worry food won't last until get money to buy more No   Food not last or not have enough money for food? No   Do you have  housing? (Housing is defined as stable permanent housing and does not include staying outside in a car, in a tent, in an abandoned building, in an overnight shelter, or couch-surfing.) Yes   Are you worried about losing your housing? No   Lack of transportation? No   Unable to get utilities (heat,electricity)? No         2025   Dental   Dentist two times every year? (!) NO         Today's PHQ-9 Score:       5/15/2025     9:16 AM   PHQ-9 SCORE   PHQ-9 Total Score 0           2025   Substance Use   Alcohol more than 3/day or more than 7/wk No   Do you use any other substances recreationally? No     Social History     Tobacco Use    Smoking status: Former     Types: Cigarettes    Tobacco comments:     4 years  [id number]   Vaping Use    Vaping status: Never Used   Substance Use Topics    Alcohol use: Yes     Alcohol/week: 3.0 standard drinks of alcohol    Drug use: No            Mammogram Screening - Patient under 40 years of age: Routine Mammogram Screening not recommended.         2025   STI Screening   New sexual partner(s) since last STI/HIV test? No     History of abnormal Pap smear: No - age 30- 64 PAP with HPV every 5 years recommended             2025   Contraception/Family Planning   Questions about contraception or family planning No   What are your periods like? (!) HEAVY FLOW        Reviewed and updated as needed this visit by Provider   Tobacco  Allergies  Meds  Problems  Med Hx  Surg Hx  Fam Hx            Past Medical History:   Diagnosis Date    Exercise induced bronchospasm 2025    Feeling pelvic pressure in pregnancy, antepartum 2023    Major depressive disorder, single episode, moderate (H) 2009     (spontaneous vaginal delivery) 2023     History reviewed. No pertinent surgical history.  Lab work is in process  Labs reviewed in EPIC  BP Readings from Last 3 Encounters:   25 123/87   25 96/74   05/15/25 108/74    Wt Readings from Last 3  "Encounters:   07/09/25 80.3 kg (177 lb)   05/22/25 79.5 kg (175 lb 3.2 oz)   05/15/25 79.2 kg (174 lb 9.6 oz)                  Patient Active Problem List   Diagnosis    Gastritis    Allergic rhinitis    Scalp cyst    Episodic mydriasis of left eye    Acne, unspecified acne type     History reviewed. No pertinent surgical history.    Social History     Tobacco Use    Smoking status: Former     Types: Cigarettes    Smokeless tobacco: Not on file    Tobacco comments:     4 years  [id number]   Substance Use Topics    Alcohol use: Yes     Alcohol/week: 3.0 standard drinks of alcohol     Family History   Problem Relation Age of Onset    Hyperlipidemia Mother     Lymphoma Mother     Breast Cancer Paternal Grandmother 80         Current Outpatient Medications   Medication Sig Dispense Refill    tretinoin (RETIN-A) 0.025 % external cream Apply topically at bedtime. 45 g 1     Allergies   Allergen Reactions    Cats Hives     Eyes swelled itching    Pollen Extract          Review of Systems  Constitutional, HEENT, cardiovascular, pulmonary, gi and gu systems are negative, except as otherwise noted.     Objective    Exam  /87   Pulse 70   Temp 97  F (36.1  C) (Tympanic)   Resp 16   Ht 1.664 m (5' 5.5\")   Wt 80.3 kg (177 lb)   LMP 06/23/2025   SpO2 98%   BMI 29.01 kg/m     Estimated body mass index is 29.01 kg/m  as calculated from the following:    Height as of this encounter: 1.664 m (5' 5.5\").    Weight as of this encounter: 80.3 kg (177 lb).    Physical Exam  GENERAL: alert and no distress  EYES: Eyes grossly normal to inspection, PERRL and conjunctivae and sclerae normal  HENT: ear canals and TM's normal, nose and mouth without ulcers or lesions  NECK: no adenopathy, no asymmetry, masses, or scars  RESP: lungs clear to auscultation - no rales, rhonchi or wheezes  CV: regular rate and rhythm, normal S1 S2, no S3 or S4, no murmur, click or rub, no peripheral edema  ABDOMEN: soft, nontender, no " hepatosplenomegaly, no masses and bowel sounds normal  MS: no gross musculoskeletal defects noted, no edema  SKIN: no suspicious lesions or rashes  NEURO: Normal strength and tone, mentation intact and speech normal  PSYCH: mentation appears normal, affect normal/bright        Signed Electronically by: Sam Price PA-C

## 2025-07-10 ENCOUNTER — PATIENT OUTREACH (OUTPATIENT)
Dept: CARE COORDINATION | Facility: CLINIC | Age: 30
End: 2025-07-10
Payer: COMMERCIAL

## 2025-07-10 LAB
GAMMA INTERFERON BACKGROUND BLD IA-ACNC: 0.04 IU/ML
M TB IFN-G BLD-IMP: NEGATIVE
M TB IFN-G CD4+ BCKGRND COR BLD-ACNC: 9.96 IU/ML
MITOGEN IGNF BCKGRD COR BLD-ACNC: 0.1 IU/ML
MITOGEN IGNF BCKGRD COR BLD-ACNC: 0.18 IU/ML
QUANTIFERON MITOGEN: 10 IU/ML
QUANTIFERON NIL TUBE: 0.04 IU/ML
QUANTIFERON TB1 TUBE: 0.22 IU/ML
QUANTIFERON TB2 TUBE: 0.14

## 2025-07-14 ENCOUNTER — PATIENT OUTREACH (OUTPATIENT)
Dept: CARE COORDINATION | Facility: CLINIC | Age: 30
End: 2025-07-14
Payer: COMMERCIAL

## 2025-07-17 ENCOUNTER — OFFICE VISIT (OUTPATIENT)
Dept: OBGYN | Facility: CLINIC | Age: 30
End: 2025-07-17
Attending: PHYSICIAN ASSISTANT
Payer: COMMERCIAL

## 2025-07-17 VITALS
DIASTOLIC BLOOD PRESSURE: 79 MMHG | WEIGHT: 176 LBS | HEART RATE: 80 BPM | OXYGEN SATURATION: 98 % | HEIGHT: 65 IN | BODY MASS INDEX: 29.32 KG/M2 | SYSTOLIC BLOOD PRESSURE: 117 MMHG

## 2025-07-17 DIAGNOSIS — N92.0 MENORRHAGIA WITH REGULAR CYCLE: ICD-10-CM

## 2025-07-17 NOTE — PROGRESS NOTES
[facility] OBGYN    CC: heavy periods    S:Radha Allison is a 29 year old  who presents today for heavy regular periods.  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  She has very heavy bleeding day 2-3 of her period and it tapers off day 4-5.  When it is heavy she saturates a super tampon in 30 minutes.  She does have associated cramping during her periods.  They are regular about every 28 days.  She specifically states she is not interested in using any form of birth control for management.    OBGYN Hx:   OB History    Para Term  AB Living   3 3 3 0 0 3   SAB IAB Ectopic Multiple Live Births   0 0 0 0 3      # Outcome Date GA Lbr Eamon/2nd Weight Sex Type Anes PTL Lv   3 Term 23 39w2d 01:44 / 00:02 3.28 kg (7 lb 3.7 oz) M Vag-Spont Other N NIELS      Name: JAYLIN ALLISON      Apgar1: 8  Apgar5: 9   2 Term 05/15/21 39w1d 10:50 / 00:02 3.47 kg (7 lb 10.4 oz) M Vag-Spont Other N NIELS      Name: Estuardo      Apgar1: 8  Apgar5: 9   1 Term 19 38w5d 11:00 / 00:35 3.86 kg (8 lb 8.2 oz) M Vag-Spont Nitrous, Other, Local N NIELS      Name: Deion Clarke      Apgar1: 8  Apgar5: 9       Patient's last menstrual period was 2025.  Menses:see hPI  Sexually active with one male partner.  They use vasectomy for birth control  STD Hx:none  Pap hx:23 NILM    PMH:   Past Medical History:   Diagnosis Date    Exercise induced bronchospasm 2025    Feeling pelvic pressure in pregnancy, antepartum 2023    Hypercholesterolemia     Major depressive disorder, single episode, moderate (H) 2009     (spontaneous vaginal delivery) 2023       PSH: none  Meds:none  Allergies:  Allergies   Allergen Reactions    Cats Hives     Eyes swelled itching    Pollen Extract        SH:Denies any tobacco or drug use.  Consumes 2-3 drinks per week.   FH:I have reviewed this patient's family history and updated it  "with pertinent information if needed.  Family History   Problem Relation Age of Onset    Hyperlipidemia Mother     Lymphoma Mother     Breast Cancer Paternal Grandmother 80       O: Patient Vitals for the past 24 hrs:   BP Pulse SpO2 Height Weight   25 1459 117/79 80 98 % 1.651 m (5' 5\") 79.8 kg (176 lb)   ]  Exam:  General- awake, alert, answering questions appropriately, appears comfortable  CV- regular rate  Lung- breathing comfortably on room air    A&P: Radha Mark is a 29 year old  who presents today for heavy regular periods.     (N92.0) Menorrhagia with regular cycle  Comment: Reviewed with patient potential contributors to heavy periods including abnormal thyroid and abnormal prolactin.  Discussed recent Hgb WNL, so heavy periods are not resulting in anemia.  Explained work up for structural contributors to heavy menses including fibroids and polyps.  Explained adenomyosis also on differential.  If labs and ultrasound are normal, then birth control options are typical treatment to decrease the bleeding with menses.  Will discuss next steps following work up.   Plan: TSH with free T4 reflex, Prolactin, US         Transvaginal Pelvic Non-OB        Video visit to discuss results and next steps.    Katharina Saunders MD          "

## 2025-07-31 ENCOUNTER — ANCILLARY PROCEDURE (OUTPATIENT)
Dept: ULTRASOUND IMAGING | Facility: CLINIC | Age: 30
End: 2025-07-31
Attending: OBSTETRICS & GYNECOLOGY
Payer: COMMERCIAL

## 2025-07-31 DIAGNOSIS — N92.0 MENORRHAGIA WITH REGULAR CYCLE: ICD-10-CM

## 2025-07-31 PROCEDURE — 76830 TRANSVAGINAL US NON-OB: CPT | Mod: GC | Performed by: RADIOLOGY
